# Patient Record
Sex: FEMALE | Race: WHITE | Employment: FULL TIME | URBAN - NONMETROPOLITAN AREA
[De-identification: names, ages, dates, MRNs, and addresses within clinical notes are randomized per-mention and may not be internally consistent; named-entity substitution may affect disease eponyms.]

---

## 2017-01-04 DIAGNOSIS — R56.9 PSEUDOSEIZURES (HCC): ICD-10-CM

## 2017-01-04 RX ORDER — LEVETIRACETAM 250 MG/1
250 TABLET ORAL NIGHTLY
Qty: 30 TABLET | Refills: 3 | Status: SHIPPED | OUTPATIENT
Start: 2017-01-04 | End: 2017-08-03 | Stop reason: SDUPTHER

## 2017-01-18 ENCOUNTER — TELEPHONE (OUTPATIENT)
Dept: PHYSICAL MEDICINE AND REHAB | Age: 47
End: 2017-01-18

## 2017-01-18 DIAGNOSIS — R56.9 CONVULSIONS, UNSPECIFIED CONVULSION TYPE (HCC): Chronic | ICD-10-CM

## 2017-08-03 DIAGNOSIS — R56.9 PSEUDOSEIZURES (HCC): ICD-10-CM

## 2017-08-04 RX ORDER — LEVETIRACETAM 250 MG/1
250 TABLET ORAL NIGHTLY
Qty: 30 TABLET | Refills: 3 | Status: SHIPPED | OUTPATIENT
Start: 2017-08-04 | End: 2017-08-16 | Stop reason: SDUPTHER

## 2017-08-16 ENCOUNTER — OFFICE VISIT (OUTPATIENT)
Dept: NEUROLOGY | Age: 47
End: 2017-08-16
Payer: COMMERCIAL

## 2017-08-16 VITALS — SYSTOLIC BLOOD PRESSURE: 112 MMHG | HEIGHT: 65 IN | HEART RATE: 74 BPM | DIASTOLIC BLOOD PRESSURE: 80 MMHG

## 2017-08-16 DIAGNOSIS — R56.9 PSEUDOSEIZURES (HCC): Primary | ICD-10-CM

## 2017-08-16 PROCEDURE — 99213 OFFICE O/P EST LOW 20 MIN: CPT | Performed by: PSYCHIATRY & NEUROLOGY

## 2017-08-16 RX ORDER — LEVETIRACETAM 250 MG/1
250 TABLET ORAL NIGHTLY
Qty: 90 TABLET | Refills: 3 | Status: SHIPPED | OUTPATIENT
Start: 2017-08-16 | End: 2018-08-20 | Stop reason: SDUPTHER

## 2018-08-20 ENCOUNTER — OFFICE VISIT (OUTPATIENT)
Dept: NEUROLOGY | Age: 48
End: 2018-08-20
Payer: COMMERCIAL

## 2018-08-20 VITALS
DIASTOLIC BLOOD PRESSURE: 70 MMHG | BODY MASS INDEX: 26.16 KG/M2 | HEIGHT: 65 IN | HEART RATE: 66 BPM | SYSTOLIC BLOOD PRESSURE: 118 MMHG | WEIGHT: 157 LBS

## 2018-08-20 DIAGNOSIS — R56.9 PSEUDOSEIZURES (HCC): Primary | ICD-10-CM

## 2018-08-20 PROCEDURE — 99213 OFFICE O/P EST LOW 20 MIN: CPT | Performed by: PSYCHIATRY & NEUROLOGY

## 2018-08-20 RX ORDER — LEVETIRACETAM 250 MG/1
250 TABLET ORAL NIGHTLY
Qty: 90 TABLET | Refills: 3 | Status: SHIPPED | OUTPATIENT
Start: 2018-08-20 | End: 2019-11-15 | Stop reason: SDUPTHER

## 2018-08-20 NOTE — PROGRESS NOTES
NEUROLOGY OUT PATIENT FOLLOW UP NOTE:  8/20/20183:20 PM    Maria Alejandra Sheehan is here for follow up for   Patient Active Problem List   Diagnosis    Fall    [facility] ([facility] [address]    Thyroid disease    Fibromyalgia    Acute respiratory failure (HCC)    Back pain    Anxiety and depression    Convulsion, non-epileptic ([facility] [address]   Follow up for non epileptic seizures. She reports no events. She reports no side effects to the Keppra 250 mg daily. She feels her headache and concentration are well controlled. She is pleased with how she is doing. She is aware her symptoms are non epileptic in nature. She denies new events. ROS:  Respiratory : no cough, no hemoptysis. Cardiac: no chest pain. No palpitations.   Skin: no rash      Allergies   Allergen Reactions    Dermabond Rash    Detrol [Tolterodine] Nausea Only       Current Outpatient Prescriptions:     levETIRAcetam (KEPPRA) 250 MG tablet, Take 1 tablet by mouth nightly, Disp: 90 tablet, Rfl: 3    levothyroxine (SYNTHROID) 50 MCG tablet, Take 1 tablet by mouth daily Indications: dose unknown, Disp: 30 tablet, Rfl: 3    Pancrelipase, Lip-Prot-Amyl, (VIOKACE PO), Take 4 tablets by mouth 3 times daily (before meals), Disp: , Rfl:     Pancrelipase, Lip-Prot-Amyl, (VIOKACE PO), Take 2 tablets by mouth as needed (with snacks), Disp: , Rfl:     gabapentin (NEURONTIN) 600 MG tablet, Take 600 mg by mouth 3 times daily, Disp: , Rfl:     pantoprazole (PROTONIX) 40 MG tablet, Take 40 mg by mouth 2 times daily, Disp: , Rfl:     DULoxetine (CYMBALTA) 60 MG capsule, Take 60 mg by mouth daily Indications: 2 Tabs in AM, Disp: , Rfl:     traZODone (DESYREL) 100 MG tablet, Take 100 mg by mouth nightly as needed for Sleep, Disp: , Rfl:     Dicyclomine HCl (BENTYL PO), Take by mouth as needed Indications: dose unknown , Disp: , Rfl:     sucralfate (CARAFATE) 1 GM tablet, Take 1 g by mouth as needed, Disp: , Rfl:       PE:   Vitals:    08/20/18 1501   BP: 118/70 Site: Left Arm   Position: Sitting   Cuff Size: Medium Adult   Pulse: 66   Weight: 157 lb (71.2 kg)   Height: 5' 5\" (1.651 m)     General Appearance:  alert and cooperative  Skin:  Skin color, texture, turgor normal. No rashes or lesions. Gen: Language is Intact. Head: no icterus  Neck: There is no carotid bruits. The Neck is supple. Neuro: CN 2-12 grossly intact with no focal deficits. Power 5/5 Throughout symmetric, Reflexes are +2 symmetric. Long tracts are intact. Cerebellar exam is Intact. Sensory exam is intact to light touch. Gait is intact. Musculoskeletal:  Has no hand arthritis, no limitation of ROM in any of the four extremities. Lower extremities no edema        DATA:  Results for orders placed or performed during the hospital encounter of 06/23/16   MRSA Screening Culture Only   Result Value Ref Range    MRSA SCREEN No MRSA isolated    VRE culture   Result Value Ref Range    VRE Culture Culture screen is negative for VRE colonization.     CBC auto differential   Result Value Ref Range    WBC 8.9 4.8 - 10.8 thou/mm3    RBC 4.13 (L) 4.20 - 5.40 mill/mm3    Hemoglobin 12.2 12.0 - 16.0 gm/dl    Hematocrit 36.2 (L) 37.0 - 47.0 %    MCV 87.5 81.0 - 99.0 fL    MCH 29.5 27.0 - 31.0 pg    MCHC 33.7 33.0 - 37.0 gm/dl    RDW 13.7 11.5 - 14.5 %    Platelets 398 814 - 300 thou/mm3    MPV 8.3 7.4 - 10.4 mcm    Pathologist Review EKM     RBC Morphology NORMAL     Seg Neutrophils 64.4 %    Lymphocytes 26.2 %    Monocytes 5.8 %    Eosinophils 3.1 %    Basophils 0.5 %    Atypical Lymphocytes FEW %    nRBC 0 /100 wbc    Platelet Estimate ADEQUATE     Segs Absolute 5.7 1.8 - 7.7 thou/mm3    Lymphocytes # 2.3 1.0 - 4.8 thou/mm3    Monocytes # 0.5 0.4 - 1.3 thou/mm3    Eosinophils # 0.3 0.0 - 0.4 thou/mm3    Basophils # 0.0 0.0 - 0.1 thou/mm3   Basic metabolic panel   Result Value Ref Range    Sodium 141 135 - 145 meq/l    Potassium 3.9 3.5 - 5.2 meq/l    Chloride 101 98 - 111 meq/l    CO2 20 (L) 23 - 33 meq/l Glucose 103 70 - 108 mg/dl    BUN 13 7 - 22 mg/dl    CREATININE 1.0 0.4 - 1.2 mg/dl    Calcium 9.7 8.5 - 10.5 mg/dl   APTT   Result Value Ref Range    aPTT 38.4 (H) 22.0 - 38.0 seconds   Protime-INR   Result Value Ref Range    INR 2.54 (H) 0.85 - 1.13   TSH without Reflex   Result Value Ref Range    TSH 1.860 0.400 - 4.20 mcIU/ml   Prolactin   Result Value Ref Range    Prolactin 48.3 ng/ml   Anion Gap   Result Value Ref Range    Anion Gap 20.0 (H) 8.0 - 16.0 meq/l   Glomerular Filtration Rate, Estimated   Result Value Ref Range    Est, Glom Filt Rate 59 (A) ml/min/1.73m2   Osmolality   Result Value Ref Range    Osmolality Calc 281.6 275.0 - 300 mOsmol/kg   Scan of Blood Smear   Result Value Ref Range    SCAN OF BLOOD SMEAR see below    Blood Gas, Arterial   Result Value Ref Range    pH, Blood Gas 7.56 (H) 7.35 - 7.45    PCO2 28 (L) 35 - 45 mmhg    PO2 235 (H) 71 - 104 mmhg    HCO3 25 23 - 28 mmol/l    Base Excess (Calculated) 3.0 (H) -2.5 - 2.5 mmol/l    O2 Sat 100 %    IFIO2 50 %    DEVICE Ventilator     Mode A/C     SET RESPIRATORY RATE 14.0000 bpm    SET TIDAL VOLUME 550.0 ml    SET PEEP 5 mmhg    Michele Test N/A     Source: L Brachial     COLLECTED BY: [id number]    Lactic acid, plasma   Result Value Ref Range    Lactic Acid 1.8 0.5 - 2.2 mmol/l   Blood gas, arterial   Result Value Ref Range    pH, Blood Gas 7.52 (H) 7.35 - 7.45    PCO2 28 (L) 35 - 45 mmhg    PO2 135 (H) 71 - 104 mmhg    HCO3 22 (L) 23 - 28 mmol/l    Base Excess (Calculated) 0.0 -2.5 - 2.5 mmol/l    O2 Sat 99 %    IFIO2 30 %    DEVICE Ventilator     Mode A/C     SET RESPIRATORY RATE 12.0000 bpm    SET TIDAL VOLUME 500.0 ml    SET PEEP 5 mmhg    Michele Test N/A     Source: R Brachial     COLLECTED BY: [id number]    MRSA by PCR   Result Value Ref Range    MRSA SCREEN RT-PCR NEGATIVE    Phenobarbital   Result Value Ref Range    Phenobarbital 15.8 10.0 - 48.0 mcg/ml   Blood Gas, Arterial   Result Value Ref Range    pH, Blood Gas 7.36 7.35 - 7.45    PCO2 38 35 - 45 mmhg    PO2 143 (H) 71 - 104 mmhg    HCO3 22 (L) 23 - 28 mmol/l    Base Excess (Calculated) -3.0 (L) -2.5 - 2.5 mmol/l    O2 Sat 99 %    IFIO2 30 %    DEVICE Ventilator     Mode CPAP+PS     SET PEEP 5 mmhg    SET PRESS SUPP 6.00 cmH2O    Michele Test N/A     Source: R Radial     COLLECTED BY: [id number]    Basic metabolic panel   Result Value Ref Range    Sodium 140 135 - 145 meq/l    Potassium 3.8 3.5 - 5.2 meq/l    Chloride 104 98 - 111 meq/l    CO2 21 (L) 23 - 33 meq/l    Glucose 119 (H) 70 - 108 mg/dl    BUN 5 (L) 7 - 22 mg/dl    CREATININE 0.8 0.4 - 1.2 mg/dl    Calcium 8.9 8.5 - 10.5 mg/dl   CBC   Result Value Ref Range    WBC 9.6 4.8 - 10.8 thou/mm3    RBC 4.73 4.20 - 5.40 mill/mm3    Hemoglobin 13.4 12.0 - 16.0 gm/dl    Hematocrit 40.6 37.0 - 47.0 %    MCV 85.9 81.0 - 99.0 fL    MCH 28.4 27.0 - 31.0 pg    MCHC 33.1 33.0 - 37.0 gm/dl    RDW 14.6 (H) 11.5 - 14.5 %    Platelets 451 239 - 539 thou/mm3    MPV 7.4 7.4 - 10.4 mcm   Anion Gap   Result Value Ref Range    Anion Gap 15.0 8.0 - 16.0 meq/l   Glomerular Filtration Rate, Estimated   Result Value Ref Range    Est, Glom Filt Rate 77 (A) ml/min/1.73m2   Procalcitonin   Result Value Ref Range    Procalcitonin 1.63 (H) 0.05 - 0.09 ng/mL   Blood Gas, Arterial   Result Value Ref Range    pH, Blood Gas 7.38 7.35 - 7.45    PCO2 36 35 - 45 mmhg    PO2 92 71 - 104 mmhg    HCO3 21 (L) 23 - 28 mmol/l    Base Excess (Calculated) -3.0 (L) -2.5 - 2.5 mmol/l    O2 Sat 97 %    IFIO2 25 %    DEVICE Ventilator     Mode ASV     SET PEEP 5 mmhg    Michele Test N/A     Source: R Radial     COLLECTED BY: [id number]    Factor 5 Leiden   Result Value Ref Range    Factor V Leiden Mutation SEE BELOW    Prothrombin gene mutation   Result Value Ref Range    Prothrombin Mutation SEE BELOW    Basic Metabolic Panel   Result Value Ref Range    Sodium 144 135 - 145 meq/l    Potassium 3.9 3.5 - 5.2 meq/l    Chloride 108 98 - 111 meq/l    CO2 24 23 - 33 meq/l    Glucose 84 70 - 108 mg/dl    BUN 9 7 - 22 mg/dl    CREATININE 0.7 0.4 - 1.2 mg/dl    Calcium 8.8 8.5 - 10.5 mg/dl   CBC   Result Value Ref Range    WBC 4.5 (L) 4.8 - 10.8 thou/mm3    RBC 4.08 (L) 4.20 - 5.40 mill/mm3    Hemoglobin 11.8 (L) 12.0 - 16.0 gm/dl    Hematocrit 35.7 (L) 37.0 - 47.0 %    MCV 87.6 81.0 - 99.0 fL    MCH 28.9 27.0 - 31.0 pg    MCHC 33.0 33.0 - 37.0 gm/dl    RDW 14.0 11.5 - 14.5 %    Platelets 350 705 - 199 thou/mm3    MPV 7.9 7.4 - 10.4 mcm   Magnesium   Result Value Ref Range    Magnesium 2.1 1.6 - 2.4 mg/dl   Procalcitonin   Result Value Ref Range    Procalcitonin 1.21 (H) 0.05 - 0.09 ng/mL   Anion Gap   Result Value Ref Range    Anion Gap 12.0 8.0 - 16.0 meq/l   Glomerular Filtration Rate, Estimated   Result Value Ref Range    Est, Glom Filt Rate 90 WI/BVD/2.42B1   Basic Metabolic Panel   Result Value Ref Range    Sodium 141 135 - 145 meq/l    Potassium 3.8 3.5 - 5.2 meq/l    Chloride 104 98 - 111 meq/l    CO2 23 23 - 33 meq/l    Glucose 87 70 - 108 mg/dl    BUN 10 7 - 22 mg/dl    CREATININE 0.8 0.4 - 1.2 mg/dl    Calcium 9.0 8.5 - 10.5 mg/dl   Procalcitonin   Result Value Ref Range    Procalcitonin 0.48 (H) 0.05 - 0.09 ng/mL   Anion Gap   Result Value Ref Range    Anion Gap 14.0 8.0 - 16.0 meq/l   Glomerular Filtration Rate, Estimated   Result Value Ref Range    Est, Glom Filt Rate 77 (A) ml/min/1.73m2   POCT Glucose   Result Value Ref Range    POC Glucose 100 70 - 108 mg/dl           Assessment:     Diagnosis Orders   1. Pseudoseizures        She reports no events. She reports no side effects to the Keppra 250 mg daily. She feels her headache and concentration are well controlled. She is pleased with how she is doing. Her sleep is good. Her events are probably non epileptic in nature. She feels the keppra works for her and she has been event free since 6/2016. She has normal level of activity. After detailed discussion with patient we agreed on the following plan. Plan:  1. Continue with Keppra 250 mg daily.  Refills

## 2019-11-15 ENCOUNTER — OFFICE VISIT (OUTPATIENT)
Dept: NEUROLOGY | Age: 49
End: 2019-11-15
Payer: COMMERCIAL

## 2019-11-15 VITALS
BODY MASS INDEX: 25.83 KG/M2 | HEART RATE: 72 BPM | HEIGHT: 65 IN | WEIGHT: 155 LBS | DIASTOLIC BLOOD PRESSURE: 62 MMHG | SYSTOLIC BLOOD PRESSURE: 124 MMHG

## 2019-11-15 DIAGNOSIS — R56.9 PSEUDOSEIZURES (HCC): ICD-10-CM

## 2019-11-15 PROCEDURE — 99213 OFFICE O/P EST LOW 20 MIN: CPT | Performed by: NURSE PRACTITIONER

## 2019-11-15 RX ORDER — LEVETIRACETAM 250 MG/1
250 TABLET ORAL NIGHTLY
Qty: 90 TABLET | Refills: 3 | Status: SHIPPED | OUTPATIENT
Start: 2019-11-15 | End: 2020-10-27

## 2020-10-27 RX ORDER — LEVETIRACETAM 250 MG/1
TABLET ORAL
Qty: 90 TABLET | Refills: 3 | Status: SHIPPED | OUTPATIENT
Start: 2020-10-27 | End: 2020-12-28 | Stop reason: SDUPTHER

## 2020-12-28 ENCOUNTER — VIRTUAL VISIT (OUTPATIENT)
Dept: NEUROLOGY | Age: 50
End: 2020-12-28
Payer: COMMERCIAL

## 2020-12-28 PROCEDURE — 99213 OFFICE O/P EST LOW 20 MIN: CPT | Performed by: NURSE PRACTITIONER

## 2020-12-28 RX ORDER — LEVETIRACETAM 250 MG/1
TABLET ORAL
Qty: 90 TABLET | Refills: 3 | Status: SHIPPED | OUTPATIENT
Start: 2020-12-28

## 2020-12-28 ASSESSMENT — ENCOUNTER SYMPTOMS
RESPIRATORY NEGATIVE: 1
GASTROINTESTINAL NEGATIVE: 1
EYES NEGATIVE: 1

## 2020-12-28 NOTE — PROGRESS NOTES
2020    TELEHEALTH EVALUATION -- Audio/Visual (During PHR-09 public health emergency)    HPI:    Namrata Hernandez (:  1970) has requested an audio/video evaluation for the following concern(s): pseudoseizure. She is doing well. She denies any seizure. She is on Keppra 250 mg daily. She denies any side effects to the medication. She denies any headache. She is being evaluated vi a video link to discuss the plan of care going forward. Review of Systems   Eyes: Negative. Respiratory: Negative. Cardiovascular: Negative. Gastrointestinal: Negative. Prior to Visit Medications    Medication Sig Taking?  Authorizing Provider   levETIRAcetam (KEPPRA) 250 MG tablet TAKE 1 TABLET BY MOUTH EVERY NIGHT  PAIGE Palmer CNP   levothyroxine (SYNTHROID) 50 MCG tablet Take 1 tablet by mouth daily Indications: dose unknown  Silvina Buck MD   Pancrelipase, Lip-Prot-Amyl, (VIOKACE PO) Take 4 tablets by mouth 3 times daily (before meals)  Historical Provider, MD   Pancrelipase, Lip-Prot-Amyl, (VIOKACE PO) Take 2 tablets by mouth as needed (with snacks)  Historical Provider, MD   pantoprazole (PROTONIX) 40 MG tablet Take 40 mg by mouth 2 times daily  Historical Provider, MD   DULoxetine (CYMBALTA) 60 MG capsule Take 60 mg by mouth daily Indications: 2 Tabs in AM  Historical Provider, MD   Dicyclomine HCl (BENTYL PO) Take by mouth as needed Indications: dose unknown   Historical Provider, MD   sucralfate (CARAFATE) 1 GM tablet Take 1 g by mouth as needed  Historical Provider, MD       Social History     Tobacco Use    Smoking status: Never Smoker    Smokeless tobacco: Never Used   Substance Use Topics    Alcohol use: No    Drug use: No        Past Medical History:   Diagnosis Date    Anxiety and depression     Back pain     Fibromyalgia     Pancreatitis 2010    diagnosed at [address] [place] [address] clinic apparenlty    Psychosomatic seizure     diagnosed at [address] [place] [address] in the past  Pulmonary embolism ([facility] [address]     Rectal cancer (HCC)     as per pateint, has some procedures done and gets regular colonoscpy, Dr. Neville Das, at [address] [place] [address], will need to confirm    Seizures ([facility] [address]     Thyroid disease    ,   Past Surgical History:   Procedure Laterality Date    BREAST REDUCTION SURGERY  2011 [address]    HYSTERECTOMY  2004    KNEE SURGERY Left 09/2017    OTHER SURGICAL HISTORY  2000    Interstim sacral implant (neurostimulator for bladder)   ,   Social History     Tobacco Use    Smoking status: Never Smoker    Smokeless tobacco: Never Used   Substance Use Topics    Alcohol use: No    Drug use: No   ,   Family History   Problem Relation Age of Onset    Cancer Maternal Grandmother     Cancer Maternal Grandfather     Diabetes Maternal Grandfather     High Blood Pressure Paternal Grandmother        PHYSICAL EXAMINATION:  [ INSTRUCTIONS:  \"[x]\" Indicates a positive item  \"[]\" Indicates a negative item  -- DELETE ALL ITEMS NOT EXAMINED]  Vital Signs: (As obtained by patient/caregiver or practitioner observation)    Blood pressure-  Heart rate-    Respiratory rate-    Temperature-  Pulse oximetry-     Constitutional: [x] Appears well-developed and well-nourished [x] No apparent distress      [] Abnormal-   Mental status  [x] Alert and awake  [x] Oriented to person/place/time [x]Able to follow commands      Eyes:  EOM    [x]  Normal  [] Abnormal-  Sclera  [x]  Normal  [] Abnormal -         Discharge [x]  None visible  [] Abnormal -    HENT:   [x] Normocephalic, atraumatic.   [] Abnormal   [x] Mouth/Throat: Mucous membranes are moist.     External Ears [x] Normal  [] Abnormal-     Neck: [x] No visualized mass     Pulmonary/Chest: [x] Respiratory effort normal.  [x] No visualized signs of difficulty breathing or respiratory distress        [] Abnormal-      Musculoskeletal:   [] Normal gait with no signs of ataxia         [x] Normal range of motion of neck        [] Abnormal- Neurological:        [x] No Facial Asymmetry (Cranial nerve 7 motor function) (limited exam to video visit)          [x] No gaze palsy        [] Abnormal-         Skin:        [x] No significant exanthematous lesions or discoloration noted on facial skin         [] Abnormal-            Psychiatric:       [x] Normal Affect [x] No Hallucinations        [] Abnormal-     Other pertinent observable physical exam findings-     ASSESSMENT/PLAN:  1. Pseudoseizures    She is doing well. She denies any seizure. She is on Keppra 250 mg daily. She denies any side effects to the medication. She denies any headache. She is pleased with how she is doing. After detailed discussion with patient we agreed on the following plan. Plan:  1. Continue with Keppra 250 mg daily. Refills given. 2.  Follow up in 12 months or sooner if needed. 3. Call if any questions or concerns. Enmanuel Rivera is a 45 y.o. female being evaluated by a Virtual Visit (video visit) encounter to address concerns as mentioned above. A caregiver was present when appropriate. Due to this being a TeleHealth encounter (During TARKG-01 public health emergency), evaluation of the following organ systems was limited: Vitals/Constitutional/EENT/Resp/CV/GI//MS/Neuro/Skin/Heme-Lymph-Imm. Pursuant to the emergency declaration under the [address] [place] [address] authority and the [employer] Act, this Virtual Visit was conducted with patient's (and/or legal guardian's) consent, to reduce the patient's risk of exposure to COVID-19 and provide necessary medical care. The patient (and/or legal guardian) has also been advised to contact this office for worsening conditions or problems, and seek emergency medical treatment and/or call 911 if deemed necessary.      Patient identification was verified at the start of the visit: Yes Total time spent on this encounter: 15 minutes    Services were provided through a video synchronous discussion virtually to substitute for in-person clinic visit. Patient and provider were located at their individual homes. --PAIGE Davis CNP on 12/28/2020 at 8:42 AM    An electronic signature was used to authenticate this note.